# Patient Record
Sex: FEMALE | Race: WHITE | NOT HISPANIC OR LATINO | Employment: OTHER | RURAL
[De-identification: names, ages, dates, MRNs, and addresses within clinical notes are randomized per-mention and may not be internally consistent; named-entity substitution may affect disease eponyms.]

---

## 2023-03-28 ENCOUNTER — TELEPHONE (OUTPATIENT)
Dept: PULMONOLOGY | Facility: CLINIC | Age: 63
End: 2023-03-28

## 2023-03-28 NOTE — TELEPHONE ENCOUNTER
----- Message from Vera Acevedo sent at 3/28/2023  8:28 AM CDT -----  Beatrice at [facility]  is trying to referred patient to Dr. Dempsey. Patient have pulmonary nodule on the rt side and she also have COPD. Ph# [phone].

## 2023-03-28 NOTE — TELEPHONE ENCOUNTER
REFERRAL SCANNED IN, APPT SET UP AND LETTER MAILED OUT. ALSO, GOING TO CALL CLINIC TO INFORM THEM

## 2023-05-01 ENCOUNTER — OFFICE VISIT (OUTPATIENT)
Dept: PULMONOLOGY | Facility: CLINIC | Age: 63
End: 2023-05-01
Payer: MEDICARE

## 2023-05-01 VITALS
RESPIRATION RATE: 16 BRPM | WEIGHT: 172 LBS | DIASTOLIC BLOOD PRESSURE: 84 MMHG | BODY MASS INDEX: 30.48 KG/M2 | OXYGEN SATURATION: 95 % | HEIGHT: 63 IN | HEART RATE: 91 BPM | SYSTOLIC BLOOD PRESSURE: 120 MMHG

## 2023-05-01 DIAGNOSIS — J44.9 CHRONIC OBSTRUCTIVE PULMONARY DISEASE, UNSPECIFIED COPD TYPE: ICD-10-CM

## 2023-05-01 DIAGNOSIS — R91.1 SOLITARY PULMONARY NODULE: ICD-10-CM

## 2023-05-01 DIAGNOSIS — R22.2 LOCALIZED SWELLING, MASS AND LUMP, TRUNK: Primary | ICD-10-CM

## 2023-05-01 PROCEDURE — 99203 OFFICE O/P NEW LOW 30 MIN: CPT | Mod: S$PBB,,, | Performed by: INTERNAL MEDICINE

## 2023-05-01 PROCEDURE — 99214 OFFICE O/P EST MOD 30 MIN: CPT | Mod: PBBFAC | Performed by: INTERNAL MEDICINE

## 2023-05-01 PROCEDURE — 99203 PR OFFICE/OUTPT VISIT, NEW, LEVL III, 30-44 MIN: ICD-10-PCS | Mod: S$PBB,,, | Performed by: INTERNAL MEDICINE

## 2023-05-01 RX ORDER — CHOLECALCIFEROL (VITAMIN D3) 25 MCG
1000 TABLET,CHEWABLE ORAL
COMMUNITY

## 2023-05-01 RX ORDER — LISINOPRIL 10 MG/1
10 TABLET ORAL
COMMUNITY
Start: 2022-12-22

## 2023-05-01 RX ORDER — CYCLOBENZAPRINE HCL 5 MG
TABLET ORAL
COMMUNITY
Start: 2022-11-08

## 2023-05-01 RX ORDER — ASPIRIN 325 MG
325 TABLET, DELAYED RELEASE (ENTERIC COATED) ORAL
COMMUNITY

## 2023-05-01 RX ORDER — ALBUTEROL SULFATE 90 UG/1
AEROSOL, METERED RESPIRATORY (INHALATION)
COMMUNITY
Start: 2023-03-15

## 2023-05-01 RX ORDER — MECLIZINE HYDROCHLORIDE 25 MG/1
25 TABLET ORAL DAILY PRN
COMMUNITY

## 2023-05-01 RX ORDER — ATORVASTATIN CALCIUM 10 MG/1
10 TABLET, FILM COATED ORAL
COMMUNITY
Start: 2022-12-22

## 2023-05-01 RX ORDER — LORATADINE 10 MG/1
10 TABLET ORAL
COMMUNITY

## 2023-05-01 RX ORDER — OMEPRAZOLE 40 MG/1
CAPSULE, DELAYED RELEASE ORAL
COMMUNITY
Start: 2023-03-30

## 2023-05-01 RX ORDER — TRAMADOL HYDROCHLORIDE 50 MG/1
50 TABLET ORAL EVERY 8 HOURS PRN
COMMUNITY
Start: 2022-12-01

## 2023-05-01 RX ORDER — LACTOBACILLUS COMBINATION NO.4 3B CELL
1 CAPSULE ORAL DAILY
COMMUNITY

## 2023-05-02 PROBLEM — J44.9 CHRONIC OBSTRUCTIVE PULMONARY DISEASE, UNSPECIFIED COPD TYPE: Status: ACTIVE | Noted: 2023-05-02

## 2023-05-02 PROBLEM — R91.1 SOLITARY PULMONARY NODULE: Status: ACTIVE | Noted: 2023-05-02

## 2023-05-02 NOTE — PROGRESS NOTES
"Subjective:       Patient ID: Vera Jensen is a 63 y.o. female.    Chief Complaint: Pulmonary Nodules (Referred by Shree) and COPD    Pulmonary Nodules  This is a chronic problem. The current episode started more than 1 month ago. The problem has been unchanged. Pertinent negatives include no abdominal pain, arthralgias, chest pain, chills, congestion, headaches or rash.   COPD  Pertinent negatives include no abdominal pain, arthralgias, chest pain, chills, congestion, headaches or rash.   No past medical history on file.  No past surgical history on file.  No family history on file.  Review of patient's allergies indicates:   Allergen Reactions    Iodine Swelling     "swelling to leg" :has not had problem with recently uses constrast dye.      Social History     Tobacco Use    Smoking status: Every Day     Packs/day: 0.25     Types: Cigarettes     Start date: 1/15/1976    Smokeless tobacco: Never    Tobacco comments:     1-2 cigarettes a day      Review of Systems   Constitutional:  Negative for chills, activity change and night sweats.   HENT:  Negative for congestion and ear pain.    Eyes:  Negative for redness and itching.   Cardiovascular:  Negative for chest pain and palpitations.   Musculoskeletal:  Negative for arthralgias and back pain.   Skin:  Negative for rash.   Gastrointestinal:  Negative for abdominal pain and abdominal distention.   Neurological:  Negative for dizziness and headaches.   Hematological:  Negative for adenopathy. Does not bruise/bleed easily.   Psychiatric/Behavioral:  Negative for confusion. The patient is not nervous/anxious.      Objective:      Physical Exam   Constitutional: She is oriented to person, place, and time. She appears well-developed and well-nourished.   HENT:   Head: Normocephalic.   Nose: Nose normal.   Mouth/Throat: Oropharynx is clear and moist.   Neck: No JVD present. No thyromegaly present.   Cardiovascular: Normal rate, regular rhythm, normal heart sounds " and intact distal pulses.   Pulmonary/Chest: Normal expansion, hyperinflation, symmetric chest wall expansion, effort normal and breath sounds normal.   Abdominal: Soft. Bowel sounds are normal.   Musculoskeletal:         General: Normal range of motion.      Cervical back: Normal range of motion and neck supple.   Lymphadenopathy: No supraclavicular adenopathy is present.     She has no cervical adenopathy.   Neurological: She is alert and oriented to person, place, and time. She has normal reflexes.   Skin: Skin is warm and dry.   Psychiatric: She has a normal mood and affect. Her behavior is normal.   Personal Diagnostic Review  CT chest reviewed    No flowsheet data found.      Assessment:       1. Localized swelling, mass and lump, trunk    2. Chronic obstructive pulmonary disease, unspecified COPD type    3. Solitary pulmonary nodule        Outpatient Encounter Medications as of 5/1/2023   Medication Sig Dispense Refill    albuterol (PROVENTIL/VENTOLIN HFA) 90 mcg/actuation inhaler 2 PUFFS INHALED ORALLY EVERY 4 HOURS AS NEEDED      aspirin (ECOTRIN) 325 MG EC tablet Take 325 mg by mouth.      atorvastatin (LIPITOR) 10 MG tablet Take 10 mg by mouth.      cyanocobalamin, vitamin B-12, 2,500 mcg Tab Take 1,000 mcg by mouth.      cyclobenzaprine (FLEXERIL) 5 MG tablet 1-2 TABLET BY MOUTH THREE TIMES A DAY AS NEEDED FOR MUSCLE PAIN/MUSCLE SPASMS. MAY CAUSE DROWSINESS.      lactobacillus combination no.4 (PROBIOTIC) 3 billion cell Cap Take 1 capsule by mouth once daily.      lisinopriL 10 MG tablet Take 10 mg by mouth.      loratadine (CLARITIN) 10 mg tablet Take 10 mg by mouth.      meclizine (ANTIVERT) 25 mg tablet Take 25 mg by mouth daily as needed.      omeprazole (PRILOSEC) 40 MG capsule 1 CAPSULE BY MOUTH DAILY      soy-blk cohosh-green tea-yerba 56- mg Tab Take 1 tablet by mouth.      traMADoL (ULTRAM) 50 mg tablet Take 50 mg by mouth every 8 (eight) hours as needed.       No facility-administered  encounter medications on file as of 5/1/2023.     Orders Placed This Encounter   Procedures    CT Chest Without Contrast     Standing Status:   Future     Standing Expiration Date:   5/1/2024     Order Specific Question:   May the Radiologist modify the order per protocol to meet the clinical needs of the patient?     Answer:   Yes     Order Specific Question:   Access port per protocol?     Answer:   No       Plan:       Problem List Items Addressed This Visit          Pulmonary    Chronic obstructive pulmonary disease, unspecified COPD type    Solitary pulmonary nodule     Patient is doing well today no new issues.  Has a solitary pulmonary nodule noted 6 mm in the right middle lobe repeat CT chest without contrast in 6 months            Other Visit Diagnoses       Localized swelling, mass and lump, trunk    -  Primary    Relevant Orders    CT Chest Without Contrast

## 2023-05-02 NOTE — ASSESSMENT & PLAN NOTE
Patient is doing well today no new issues.  Has a solitary pulmonary nodule noted 6 mm in the right middle lobe repeat CT chest without contrast in 6 months

## 2023-11-01 ENCOUNTER — HOSPITAL ENCOUNTER (OUTPATIENT)
Dept: RADIOLOGY | Facility: HOSPITAL | Age: 63
Discharge: HOME | End: 2023-11-01
Attending: INTERNAL MEDICINE
Payer: MEDICARE

## 2023-11-01 ENCOUNTER — OFFICE VISIT (OUTPATIENT)
Dept: PULMONOLOGY | Facility: CLINIC | Age: 63
End: 2023-11-01
Payer: MEDICARE

## 2023-11-01 VITALS
OXYGEN SATURATION: 88 % | WEIGHT: 172 LBS | HEART RATE: 76 BPM | BODY MASS INDEX: 30.48 KG/M2 | DIASTOLIC BLOOD PRESSURE: 71 MMHG | SYSTOLIC BLOOD PRESSURE: 108 MMHG | HEIGHT: 63 IN | RESPIRATION RATE: 16 BRPM

## 2023-11-01 DIAGNOSIS — R22.2 LOCALIZED SWELLING, MASS AND LUMP, TRUNK: ICD-10-CM

## 2023-11-01 DIAGNOSIS — R91.1 SOLITARY PULMONARY NODULE: ICD-10-CM

## 2023-11-01 DIAGNOSIS — R22.2 LOCALIZED SWELLING, MASS AND LUMP, TRUNK: Primary | ICD-10-CM

## 2023-11-01 DIAGNOSIS — J44.9 CHRONIC OBSTRUCTIVE PULMONARY DISEASE, UNSPECIFIED COPD TYPE: ICD-10-CM

## 2023-11-01 DIAGNOSIS — Z72.0 TOBACCO USE: ICD-10-CM

## 2023-11-01 PROCEDURE — 71250 CT THORAX DX C-: CPT | Mod: TC

## 2023-11-01 PROCEDURE — 99215 OFFICE O/P EST HI 40 MIN: CPT | Mod: PBBFAC,25 | Performed by: INTERNAL MEDICINE

## 2023-11-01 PROCEDURE — 99214 OFFICE O/P EST MOD 30 MIN: CPT | Mod: S$PBB,,, | Performed by: INTERNAL MEDICINE

## 2023-11-01 PROCEDURE — 71250 CT THORAX DX C-: CPT | Mod: 26,,, | Performed by: RADIOLOGY

## 2023-11-01 PROCEDURE — 99214 PR OFFICE/OUTPT VISIT, EST, LEVL IV, 30-39 MIN: ICD-10-PCS | Mod: S$PBB,,, | Performed by: INTERNAL MEDICINE

## 2023-11-01 PROCEDURE — 71250 CT CHEST WITHOUT CONTRAST: ICD-10-PCS | Mod: 26,,, | Performed by: RADIOLOGY

## 2023-11-01 RX ORDER — CITALOPRAM 10 MG/1
TABLET ORAL
COMMUNITY
Start: 2023-10-30

## 2023-11-01 RX ORDER — TIOTROPIUM BROMIDE 18 UG/1
18 CAPSULE ORAL; RESPIRATORY (INHALATION) DAILY
Qty: 30 CAPSULE | Refills: 5 | Status: SHIPPED | OUTPATIENT
Start: 2023-11-01 | End: 2024-01-03

## 2023-11-01 NOTE — PROGRESS NOTES
"Subjective:       Patient ID: Vera Jensen is a 63 y.o. female.    Chief Complaint: Follow-up (Localized swelling, mass and lump, trunk)    Follow-up  This is a chronic problem. The current episode started more than 1 month ago. The problem has been unchanged. Pertinent negatives include no abdominal pain, arthralgias, chest pain, chills, congestion, headaches or rash.     Past Medical History:   Diagnosis Date    Essential (primary) hypertension      Past Surgical History:   Procedure Laterality Date    BREAST SURGERY      approx 40 years ago, removed a lump    HERNIA REPAIR      two hernia repair surgeries, approx 2-3 yeras    HYSTERECTOMY Bilateral     aproximetely in 1984, partial and bilateral     History reviewed. No pertinent family history.  Review of patient's allergies indicates:   Allergen Reactions    Iodine Swelling     "swelling to leg" :has not had problem with recently uses constrast dye.      Social History     Tobacco Use    Smoking status: Every Day     Current packs/day: 0.25     Average packs/day: 0.3 packs/day for 47.8 years (11.9 ttl pk-yrs)     Types: Cigarettes     Start date: 1/15/1976    Smokeless tobacco: Never    Tobacco comments:     1-2 cigarettes a day   Substance Use Topics    Alcohol use: Never    Drug use: Never      Review of Systems   Constitutional:  Negative for chills, activity change and night sweats.   HENT:  Negative for congestion and ear pain.    Eyes:  Negative for redness and itching.   Cardiovascular:  Negative for chest pain and palpitations.   Musculoskeletal:  Negative for arthralgias and back pain.   Skin:  Negative for rash.   Gastrointestinal:  Negative for abdominal pain and abdominal distention.   Neurological:  Negative for dizziness and headaches.   Hematological:  Negative for adenopathy. Does not bruise/bleed easily.   Psychiatric/Behavioral:  Negative for confusion. The patient is not nervous/anxious.        Objective:      Physical Exam " "  Constitutional: She is oriented to person, place, and time. She appears well-developed and well-nourished.   HENT:   Head: Normocephalic.   Nose: Nose normal.   Mouth/Throat: Oropharynx is clear and moist.   Neck: No JVD present. No thyromegaly present.   Cardiovascular: Normal rate, regular rhythm, normal heart sounds and intact distal pulses.   Pulmonary/Chest: Normal expansion, hyperinflation, symmetric chest wall expansion, effort normal and breath sounds normal.   Abdominal: Soft. Bowel sounds are normal.   Musculoskeletal:         General: Normal range of motion.      Cervical back: Normal range of motion and neck supple.   Lymphadenopathy: No supraclavicular adenopathy is present.     She has no cervical adenopathy.   Neurological: She is alert and oriented to person, place, and time. She has normal reflexes.   Skin: Skin is warm and dry.   Psychiatric: She has a normal mood and affect. Her behavior is normal.     Personal Diagnostic Review  none pertinent        11/1/2023     2:10 PM 5/1/2023     4:04 PM   Pulmonary Function Tests   SpO2 88 % 95 %   Height 5' 3" (1.6 m) 5' 3" (1.6 m)   Weight 78 kg (172 lb) 78 kg (172 lb)   BMI (Calculated) 30.5 30.5         Assessment:       1. Localized swelling, mass and lump, trunk    2. Chronic obstructive pulmonary disease, unspecified COPD type    3. Tobacco use    4. Solitary pulmonary nodule        Outpatient Encounter Medications as of 11/1/2023   Medication Sig Dispense Refill    albuterol (PROVENTIL/VENTOLIN HFA) 90 mcg/actuation inhaler 2 PUFFS INHALED ORALLY EVERY 4 HOURS AS NEEDED      aspirin (ECOTRIN) 325 MG EC tablet Take 325 mg by mouth.      atorvastatin (LIPITOR) 10 MG tablet Take 10 mg by mouth.      citalopram (CELEXA) 10 MG tablet       cyanocobalamin, vitamin B-12, 2,500 mcg Tab Take 1,000 mcg by mouth.      cyclobenzaprine (FLEXERIL) 5 MG tablet 1-2 TABLET BY MOUTH THREE TIMES A DAY AS NEEDED FOR MUSCLE PAIN/MUSCLE SPASMS. MAY CAUSE DROWSINESS.   "    lisinopriL 10 MG tablet Take 10 mg by mouth.      loratadine (CLARITIN) 10 mg tablet Take 10 mg by mouth.      meclizine (ANTIVERT) 25 mg tablet Take 25 mg by mouth daily as needed.      omeprazole (PRILOSEC) 40 MG capsule 1 CAPSULE BY MOUTH DAILY      soy-blk cohosh-green tea-yerba 56- mg Tab Take 1 tablet by mouth.      traMADoL (ULTRAM) 50 mg tablet Take 50 mg by mouth every 8 (eight) hours as needed.      lactobacillus combination no.4 (PROBIOTIC) 3 billion cell Cap Take 1 capsule by mouth once daily.      tiotropium (SPIRIVA WITH HANDIHALER) 18 mcg inhalation capsule Inhale 1 capsule (18 mcg total) into the lungs once daily. Controller 30 capsule 5     No facility-administered encounter medications on file as of 11/1/2023.     Orders Placed This Encounter   Procedures    CT Chest Without Contrast     Standing Status:   Future     Standing Expiration Date:   11/1/2024     Order Specific Question:   May the Radiologist modify the order per protocol to meet the clinical needs of the patient?     Answer:   Yes     Order Specific Question:   Access port per protocol?     Answer:   No       Plan:       Problem List Items Addressed This Visit          Pulmonary    Chronic obstructive pulmonary disease, unspecified COPD type     Patient has worsening shortness of breath.  Were going to go ahead and add Spiriva to her regimen her O2 sat was 88% she needs to quit smoking we talked about smoking cessation its length         Solitary pulmonary nodule     6 mm nodule unchanged repeat CT in 6 months            Other    Tobacco use     Want to use nicotine gum to quit smoking          Other Visit Diagnoses       Localized swelling, mass and lump, trunk    -  Primary    Relevant Orders    CT Chest Without Contrast

## 2023-11-01 NOTE — ASSESSMENT & PLAN NOTE
Patient has worsening shortness of breath.  Were going to go ahead and add Spiriva to her regimen her O2 sat was 88% she needs to quit smoking we talked about smoking cessation its length

## 2023-11-10 RX ORDER — UMECLIDINIUM 62.5 UG/1
62.5 AEROSOL, POWDER ORAL DAILY
Qty: 30 EACH | Refills: 1 | Status: SHIPPED | OUTPATIENT
Start: 2023-11-10 | End: 2024-01-03 | Stop reason: SDUPTHER

## 2024-01-03 ENCOUNTER — HOSPITAL ENCOUNTER (OUTPATIENT)
Dept: RADIOLOGY | Facility: HOSPITAL | Age: 64
Discharge: HOME | End: 2024-01-03
Attending: INTERNAL MEDICINE
Payer: MEDICARE

## 2024-01-03 ENCOUNTER — OFFICE VISIT (OUTPATIENT)
Dept: PULMONOLOGY | Facility: CLINIC | Age: 64
End: 2024-01-03
Payer: MEDICARE

## 2024-01-03 VITALS
WEIGHT: 174.63 LBS | SYSTOLIC BLOOD PRESSURE: 102 MMHG | HEIGHT: 63 IN | BODY MASS INDEX: 30.94 KG/M2 | OXYGEN SATURATION: 94 % | HEART RATE: 103 BPM | DIASTOLIC BLOOD PRESSURE: 86 MMHG | RESPIRATION RATE: 18 BRPM

## 2024-01-03 DIAGNOSIS — R22.2 LOCALIZED SWELLING, MASS AND LUMP, TRUNK: Primary | ICD-10-CM

## 2024-01-03 DIAGNOSIS — Z72.0 TOBACCO USE: ICD-10-CM

## 2024-01-03 DIAGNOSIS — R22.2 LOCALIZED SWELLING, MASS AND LUMP, TRUNK: ICD-10-CM

## 2024-01-03 DIAGNOSIS — J44.9 CHRONIC OBSTRUCTIVE PULMONARY DISEASE, UNSPECIFIED COPD TYPE: ICD-10-CM

## 2024-01-03 DIAGNOSIS — R91.1 SOLITARY PULMONARY NODULE: ICD-10-CM

## 2024-01-03 PROCEDURE — 99215 OFFICE O/P EST HI 40 MIN: CPT | Mod: PBBFAC,25 | Performed by: INTERNAL MEDICINE

## 2024-01-03 PROCEDURE — 71250 CT THORAX DX C-: CPT | Mod: 26,,, | Performed by: STUDENT IN AN ORGANIZED HEALTH CARE EDUCATION/TRAINING PROGRAM

## 2024-01-03 PROCEDURE — 99214 OFFICE O/P EST MOD 30 MIN: CPT | Mod: S$PBB,,, | Performed by: INTERNAL MEDICINE

## 2024-01-03 PROCEDURE — 71250 CT THORAX DX C-: CPT | Mod: TC

## 2024-01-03 RX ORDER — UMECLIDINIUM 62.5 UG/1
62.5 AEROSOL, POWDER ORAL DAILY
Qty: 30 EACH | Refills: 1 | Status: SHIPPED | OUTPATIENT
Start: 2024-01-03

## 2024-01-03 NOTE — PROGRESS NOTES
"Subjective:       Patient ID: Vera Jensen is a 63 y.o. female.    Chief Complaint: Results (CT exam results), Cough, and Shortness of Breath (With exertion)    Cough  This is a chronic problem. The current episode started more than 1 month ago. The problem has been unchanged. Associated symptoms include shortness of breath. Pertinent negatives include no chest pain, chills, ear pain, eye redness, headaches or rash.   Shortness of Breath  Pertinent negatives include no abdominal pain, chest pain, ear pain, headaches or rash.     Past Medical History:   Diagnosis Date    Essential (primary) hypertension      Past Surgical History:   Procedure Laterality Date    BREAST SURGERY      approx 40 years ago, removed a lump    HERNIA REPAIR      two hernia repair surgeries, approx 2-3 yeras    HYSTERECTOMY Bilateral     aproximetely in 1984, partial and bilateral     History reviewed. No pertinent family history.  Review of patient's allergies indicates:   Allergen Reactions    Iodine Swelling     "swelling to leg" :has not had problem with recently uses constrast dye.      Social History     Tobacco Use    Smoking status: Every Day     Current packs/day: 0.25     Average packs/day: 0.3 packs/day for 48.0 years (12.0 ttl pk-yrs)     Types: Cigarettes     Start date: 1/15/1976    Smokeless tobacco: Never    Tobacco comments:     1-2 cigarettes a day   Substance Use Topics    Alcohol use: Never    Drug use: Never      Review of Systems   Constitutional:  Negative for chills, activity change and night sweats.   HENT:  Negative for congestion and ear pain.    Eyes:  Negative for redness and itching.   Respiratory:  Positive for cough and shortness of breath.    Cardiovascular:  Negative for chest pain and palpitations.   Musculoskeletal:  Negative for arthralgias and back pain.   Skin:  Negative for rash.   Gastrointestinal:  Negative for abdominal pain and abdominal distention.   Neurological:  Negative for dizziness and " "headaches.   Hematological:  Negative for adenopathy. Does not bruise/bleed easily.   Psychiatric/Behavioral:  Negative for confusion. The patient is not nervous/anxious.        Objective:      Physical Exam   Constitutional: She is oriented to person, place, and time. She appears well-developed and well-nourished.   HENT:   Head: Normocephalic.   Nose: Nose normal.   Mouth/Throat: Oropharynx is clear and moist.   Neck: No JVD present. No thyromegaly present.   Cardiovascular: Normal rate, regular rhythm, normal heart sounds and intact distal pulses.   Pulmonary/Chest: Normal expansion, hyperinflation, symmetric chest wall expansion, effort normal and breath sounds normal.   Abdominal: Soft. Bowel sounds are normal.   Musculoskeletal:         General: Normal range of motion.      Cervical back: Normal range of motion and neck supple.   Lymphadenopathy: No supraclavicular adenopathy is present.     She has no cervical adenopathy.   Neurological: She is alert and oriented to person, place, and time. She has normal reflexes.   Skin: Skin is warm and dry.   Psychiatric: She has a normal mood and affect. Her behavior is normal.     Personal Diagnostic Review  none pertinent        1/3/2024     3:28 PM 11/1/2023     2:10 PM 5/1/2023     4:04 PM   Pulmonary Function Tests   SpO2 94 % 88 % 95 %   Height 5' 3" (1.6 m) 5' 3" (1.6 m) 5' 3" (1.6 m)   Weight 79.2 kg (174 lb 9.6 oz) 78 kg (172 lb) 78 kg (172 lb)   BMI (Calculated) 30.9 30.5 30.5         Assessment:       1. Localized swelling, mass and lump, trunk    2. Chronic obstructive pulmonary disease, unspecified COPD type    3. Tobacco use    4. Solitary pulmonary nodule        Outpatient Encounter Medications as of 1/3/2024   Medication Sig Dispense Refill    albuterol (PROVENTIL/VENTOLIN HFA) 90 mcg/actuation inhaler 2 PUFFS INHALED ORALLY EVERY 4 HOURS AS NEEDED      aspirin (ECOTRIN) 325 MG EC tablet Take 325 mg by mouth.      atorvastatin (LIPITOR) 10 MG tablet Take " 10 mg by mouth.      citalopram (CELEXA) 10 MG tablet       cyanocobalamin, vitamin B-12, 2,500 mcg Tab Take 1,000 mcg by mouth.      cyclobenzaprine (FLEXERIL) 5 MG tablet 1-2 TABLET BY MOUTH THREE TIMES A DAY AS NEEDED FOR MUSCLE PAIN/MUSCLE SPASMS. MAY CAUSE DROWSINESS.      lactobacillus combination no.4 (PROBIOTIC) 3 billion cell Cap Take 1 capsule by mouth once daily.      lisinopriL 10 MG tablet Take 10 mg by mouth.      loratadine (CLARITIN) 10 mg tablet Take 10 mg by mouth.      omeprazole (PRILOSEC) 40 MG capsule 1 CAPSULE BY MOUTH DAILY      soy-blk cohosh-green tea-yerba 56- mg Tab Take 1 tablet by mouth.      meclizine (ANTIVERT) 25 mg tablet Take 25 mg by mouth daily as needed.      traMADoL (ULTRAM) 50 mg tablet Take 50 mg by mouth every 8 (eight) hours as needed.      umeclidinium (INCRUSE ELLIPTA) 62.5 mcg/actuation inhalation capsule Inhale 62.5 mcg into the lungs once daily. Controller 30 each 1    [DISCONTINUED] tiotropium (SPIRIVA WITH HANDIHALER) 18 mcg inhalation capsule Inhale 1 capsule (18 mcg total) into the lungs once daily. Controller (Patient not taking: Reported on 1/3/2024) 30 capsule 5    [DISCONTINUED] umeclidinium (INCRUSE ELLIPTA) 62.5 mcg/actuation inhalation capsule Inhale 62.5 mcg into the lungs once daily. Controller (Patient not taking: Reported on 1/3/2024) 30 each 1     No facility-administered encounter medications on file as of 1/3/2024.     Orders Placed This Encounter   Procedures    CT Chest Without Contrast     Standing Status:   Future     Standing Expiration Date:   1/3/2025     Order Specific Question:   May the Radiologist modify the order per protocol to meet the clinical needs of the patient?     Answer:   Yes     Order Specific Question:   Access port per protocol?     Answer:   No       Plan:       Problem List Items Addressed This Visit          Pulmonary    Chronic obstructive pulmonary disease, unspecified COPD type     Patient never got Spiriva filled  is never taking Incruse will try give him Incruse if that does not work we have asked her to get a list of medicines her insurance will pay for will write 1 of those continue use her Ventolin as needed         Solitary pulmonary nodule     Solitary pulmonary nodule unchanged x6 months            Other    Tobacco use     She not ready to quit smoking this point          Other Visit Diagnoses       Localized swelling, mass and lump, trunk    -  Primary    Relevant Orders    CT Chest Without Contrast

## 2024-01-03 NOTE — ASSESSMENT & PLAN NOTE
Patient never got Spiriva filled is never taking Incruse will try give him Incruse if that does not work we have asked her to get a list of medicines her insurance will pay for will write 1 of those continue use her Ventolin as needed

## 2024-07-10 ENCOUNTER — OFFICE VISIT (OUTPATIENT)
Dept: PULMONOLOGY | Facility: CLINIC | Age: 64
End: 2024-07-10
Payer: MEDICARE

## 2024-07-10 ENCOUNTER — HOSPITAL ENCOUNTER (OUTPATIENT)
Dept: RADIOLOGY | Facility: HOSPITAL | Age: 64
Discharge: HOME | End: 2024-07-10
Attending: INTERNAL MEDICINE
Payer: MEDICARE

## 2024-07-10 VITALS
TEMPERATURE: 98 F | WEIGHT: 176 LBS | OXYGEN SATURATION: 97 % | HEIGHT: 63 IN | DIASTOLIC BLOOD PRESSURE: 85 MMHG | SYSTOLIC BLOOD PRESSURE: 125 MMHG | BODY MASS INDEX: 31.18 KG/M2 | HEART RATE: 83 BPM

## 2024-07-10 DIAGNOSIS — Z72.0 TOBACCO USE: ICD-10-CM

## 2024-07-10 DIAGNOSIS — R22.2 LOCALIZED SWELLING, MASS AND LUMP, TRUNK: ICD-10-CM

## 2024-07-10 DIAGNOSIS — R91.1 SOLITARY PULMONARY NODULE: ICD-10-CM

## 2024-07-10 DIAGNOSIS — J44.9 CHRONIC OBSTRUCTIVE PULMONARY DISEASE, UNSPECIFIED COPD TYPE: ICD-10-CM

## 2024-07-10 DIAGNOSIS — R22.2 LOCALIZED SWELLING, MASS AND LUMP, TRUNK: Primary | ICD-10-CM

## 2024-07-10 PROCEDURE — 71250 CT THORAX DX C-: CPT | Mod: TC

## 2024-07-10 PROCEDURE — 99214 OFFICE O/P EST MOD 30 MIN: CPT | Mod: PBBFAC,25 | Performed by: INTERNAL MEDICINE

## 2024-07-10 PROCEDURE — 99214 OFFICE O/P EST MOD 30 MIN: CPT | Mod: S$PBB,,, | Performed by: INTERNAL MEDICINE

## 2024-07-10 PROCEDURE — 99999 PR PBB SHADOW E&M-EST. PATIENT-LVL IV: CPT | Mod: PBBFAC,,, | Performed by: INTERNAL MEDICINE

## 2024-07-10 PROCEDURE — 71250 CT THORAX DX C-: CPT | Mod: 26,,, | Performed by: RADIOLOGY

## 2024-07-10 RX ORDER — UMECLIDINIUM 62.5 UG/1
62.5 AEROSOL, POWDER ORAL DAILY
Qty: 60 EACH | Refills: 3 | Status: SHIPPED | OUTPATIENT
Start: 2024-07-10

## 2024-07-10 RX ORDER — ALBUTEROL SULFATE 90 UG/1
1 AEROSOL, METERED RESPIRATORY (INHALATION) EVERY 6 HOURS PRN
Qty: 18 G | Refills: 5 | Status: SHIPPED | OUTPATIENT
Start: 2024-07-10

## 2024-07-10 NOTE — PROGRESS NOTES
"Subjective:       Patient ID: Vera Jensen is a 64 y.o. female.    Chief Complaint: Follow-up (6m CT/Needs refill on her albuterol )    sob    Follow-up  This is a chronic problem. The current episode started more than 1 year ago. The problem occurs daily. The problem has been unchanged. Pertinent negatives include no abdominal pain, arthralgias, chest pain, chills, congestion, headaches or rash. The symptoms are aggravated by exertion.     Past Medical History:   Diagnosis Date    Essential (primary) hypertension      Past Surgical History:   Procedure Laterality Date    BREAST SURGERY      approx 40 years ago, removed a lump    HERNIA REPAIR      two hernia repair surgeries, approx 2-3 yeras    HYSTERECTOMY Bilateral     aproximetely in 1984, partial and bilateral     No family history on file.  Review of patient's allergies indicates:   Allergen Reactions    Iodine Swelling     "swelling to leg" :has not had problem with recently uses constrast dye.      Social History     Tobacco Use    Smoking status: Every Day     Current packs/day: 0.25     Average packs/day: 0.3 packs/day for 48.5 years (12.1 ttl pk-yrs)     Types: Cigarettes     Start date: 1/15/1976    Smokeless tobacco: Never    Tobacco comments:     1-2 cigarettes a day     1-2 pack a day 7/10/24   Substance Use Topics    Alcohol use: Never    Drug use: Never      Review of Systems   Constitutional:  Negative for chills, activity change and night sweats.   HENT:  Negative for congestion and ear pain.    Eyes:  Negative for redness and itching.   Cardiovascular:  Negative for chest pain and palpitations.   Musculoskeletal:  Negative for arthralgias and back pain.   Skin:  Negative for rash.   Gastrointestinal:  Negative for abdominal pain and abdominal distention.   Neurological:  Negative for dizziness and headaches.   Hematological:  Negative for adenopathy. Does not bruise/bleed easily.   Psychiatric/Behavioral:  Negative for confusion. The patient " "is not nervous/anxious.        Objective:      Physical Exam   Constitutional: She is oriented to person, place, and time. She appears well-developed and well-nourished.   HENT:   Head: Normocephalic.   Nose: Nose normal.   Mouth/Throat: Oropharynx is clear and moist.   Neck: No JVD present. No thyromegaly present.   Cardiovascular: Normal rate, regular rhythm, normal heart sounds and intact distal pulses.   Pulmonary/Chest: Normal expansion, hyperinflation, symmetric chest wall expansion, effort normal and breath sounds normal.   Abdominal: Soft. Bowel sounds are normal.   Musculoskeletal:         General: Normal range of motion.      Cervical back: Normal range of motion and neck supple.   Lymphadenopathy: No supraclavicular adenopathy is present.     She has no cervical adenopathy.   Neurological: She is alert and oriented to person, place, and time. She has normal reflexes.   Skin: Skin is warm and dry.   Psychiatric: She has a normal mood and affect. Her behavior is normal.     Personal Diagnostic Review  none pertinent        7/10/2024     2:30 PM 1/3/2024     3:28 PM 11/1/2023     2:10 PM 5/1/2023     4:04 PM   Pulmonary Function Tests   SpO2 97 % 94 % 88 % 95 %   Height 5' 3" (1.6 m) 5' 3" (1.6 m) 5' 3" (1.6 m) 5' 3" (1.6 m)   Weight 79.8 kg (176 lb) 79.2 kg (174 lb 9.6 oz) 78 kg (172 lb) 78 kg (172 lb)   BMI (Calculated) 31.2 30.9 30.5 30.5         Assessment:       1. Localized swelling, mass and lump, trunk    2. Chronic obstructive pulmonary disease, unspecified COPD type    3. Solitary pulmonary nodule    4. Tobacco use        Outpatient Encounter Medications as of 7/10/2024   Medication Sig Dispense Refill    aspirin (ECOTRIN) 325 MG EC tablet Take 325 mg by mouth.      atorvastatin (LIPITOR) 10 MG tablet Take 10 mg by mouth.      citalopram (CELEXA) 10 MG tablet       cyanocobalamin, vitamin B-12, 2,500 mcg Tab Take 1,000 mcg by mouth.      cyclobenzaprine (FLEXERIL) 5 MG tablet 1-2 TABLET BY MOUTH " THREE TIMES A DAY AS NEEDED FOR MUSCLE PAIN/MUSCLE SPASMS. MAY CAUSE DROWSINESS.      lactobacillus combination no.4 (PROBIOTIC) 3 billion cell Cap Take 1 capsule by mouth once daily.      lisinopriL 10 MG tablet Take 10 mg by mouth.      loratadine (CLARITIN) 10 mg tablet Take 10 mg by mouth.      meclizine (ANTIVERT) 25 mg tablet Take 25 mg by mouth daily as needed.      omeprazole (PRILOSEC) 40 MG capsule 1 CAPSULE BY MOUTH DAILY      soy-blk cohosh-green tea-yerba 56- mg Tab Take 1 tablet by mouth.      traMADoL (ULTRAM) 50 mg tablet Take 50 mg by mouth every 8 (eight) hours as needed.      [DISCONTINUED] albuterol (PROVENTIL/VENTOLIN HFA) 90 mcg/actuation inhaler 2 PUFFS INHALED ORALLY EVERY 4 HOURS AS NEEDED      [DISCONTINUED] umeclidinium (INCRUSE ELLIPTA) 62.5 mcg/actuation inhalation capsule Inhale 62.5 mcg into the lungs once daily. Controller 30 each 1    albuterol (PROVENTIL/VENTOLIN HFA) 90 mcg/actuation inhaler Inhale 1 puff into the lungs every 6 (six) hours as needed for Wheezing. Rescue 18 g 5    umeclidinium (INCRUSE ELLIPTA) 62.5 mcg/actuation inhalation capsule Inhale 62.5 mcg into the lungs once daily. Controller 60 each 3     No facility-administered encounter medications on file as of 7/10/2024.     Orders Placed This Encounter   Procedures    CT Chest Without Contrast     Standing Status:   Future     Standing Expiration Date:   7/10/2025     Order Specific Question:   May the Radiologist modify the order per protocol to meet the clinical needs of the patient?     Answer:   Yes     Order Specific Question:   Access port per protocol?     Answer:   No       Plan:       Problem List Items Addressed This Visit          Pulmonary    Chronic obstructive pulmonary disease, unspecified COPD type     mayPatient has COPD currently under pretty good control with the Incruse and Ventolin she has not required admission to the hospital currently needs to quit smoking increase activity wound  pneumonia shot         Solitary pulmonary nodule     6 mm nodule right upper lobe seems unchanged this is 14 months repeat CT in 6 months            Other    Tobacco use     Continues to smoke we discussed smoking cessation today nicotine lozenges          Other Visit Diagnoses       Localized swelling, mass and lump, trunk    -  Primary    Relevant Orders    CT Chest Without Contrast

## 2024-07-10 NOTE — ASSESSMENT & PLAN NOTE
mayPatient has COPD currently under pretty good control with the Incruse and Ventolin she has not required admission to the hospital currently needs to quit smoking increase activity wound pneumonia shot

## 2025-01-22 RX ORDER — LYSINE HCL 500 MG
1 TABLET ORAL DAILY
COMMUNITY

## 2025-01-22 RX ORDER — TURMERIC 400 MG
CAPSULE ORAL
COMMUNITY

## 2025-01-22 RX ORDER — PREDNISOLONE ACETATE 10 MG/ML
1 SUSPENSION/ DROPS OPHTHALMIC
COMMUNITY
Start: 2024-05-29

## 2025-01-22 RX ORDER — MOXIFLOXACIN 5 MG/ML
1 SOLUTION/ DROPS OPHTHALMIC
COMMUNITY
Start: 2024-05-29

## 2025-01-22 RX ORDER — MAGNESIUM OXIDE 420 MG/1
TABLET ORAL
COMMUNITY

## 2025-01-23 ENCOUNTER — HOSPITAL ENCOUNTER (OUTPATIENT)
Dept: RADIOLOGY | Facility: HOSPITAL | Age: 65
Discharge: HOME | End: 2025-01-23
Attending: INTERNAL MEDICINE
Payer: MEDICARE

## 2025-01-23 ENCOUNTER — OFFICE VISIT (OUTPATIENT)
Dept: PULMONOLOGY | Facility: CLINIC | Age: 65
End: 2025-01-23
Payer: MEDICARE

## 2025-01-23 VITALS
DIASTOLIC BLOOD PRESSURE: 64 MMHG | BODY MASS INDEX: 31.17 KG/M2 | HEART RATE: 69 BPM | WEIGHT: 175.94 LBS | OXYGEN SATURATION: 93 % | SYSTOLIC BLOOD PRESSURE: 112 MMHG | RESPIRATION RATE: 16 BRPM | HEIGHT: 63 IN

## 2025-01-23 DIAGNOSIS — R22.2 LOCALIZED SWELLING, MASS AND LUMP, TRUNK: Primary | ICD-10-CM

## 2025-01-23 DIAGNOSIS — R22.2 LOCALIZED SWELLING, MASS AND LUMP, TRUNK: ICD-10-CM

## 2025-01-23 DIAGNOSIS — Z72.0 TOBACCO USE: ICD-10-CM

## 2025-01-23 DIAGNOSIS — R91.1 SOLITARY PULMONARY NODULE: ICD-10-CM

## 2025-01-23 DIAGNOSIS — J44.9 CHRONIC OBSTRUCTIVE PULMONARY DISEASE, UNSPECIFIED COPD TYPE: ICD-10-CM

## 2025-01-23 PROCEDURE — 3008F BODY MASS INDEX DOCD: CPT | Mod: CPTII,,, | Performed by: INTERNAL MEDICINE

## 2025-01-23 PROCEDURE — 1159F MED LIST DOCD IN RCRD: CPT | Mod: CPTII,,, | Performed by: INTERNAL MEDICINE

## 2025-01-23 PROCEDURE — 3078F DIAST BP <80 MM HG: CPT | Mod: CPTII,,, | Performed by: INTERNAL MEDICINE

## 2025-01-23 PROCEDURE — 99215 OFFICE O/P EST HI 40 MIN: CPT | Mod: PBBFAC,25 | Performed by: INTERNAL MEDICINE

## 2025-01-23 PROCEDURE — 3074F SYST BP LT 130 MM HG: CPT | Mod: CPTII,,, | Performed by: INTERNAL MEDICINE

## 2025-01-23 PROCEDURE — 71250 CT THORAX DX C-: CPT | Mod: TC

## 2025-01-23 PROCEDURE — 99214 OFFICE O/P EST MOD 30 MIN: CPT | Mod: S$PBB,,, | Performed by: INTERNAL MEDICINE

## 2025-01-23 PROCEDURE — 99999 PR PBB SHADOW E&M-EST. PATIENT-LVL V: CPT | Mod: PBBFAC,,, | Performed by: INTERNAL MEDICINE

## 2025-01-23 RX ORDER — ALBUTEROL SULFATE 90 UG/1
1 INHALANT RESPIRATORY (INHALATION) EVERY 6 HOURS PRN
Qty: 18 G | Refills: 5 | Status: SHIPPED | OUTPATIENT
Start: 2025-01-23

## 2025-01-23 RX ORDER — ALBUTEROL SULFATE 0.83 MG/ML
2.5 SOLUTION RESPIRATORY (INHALATION) EVERY 6 HOURS PRN
Qty: 90 ML | Refills: 5 | Status: SHIPPED | OUTPATIENT
Start: 2025-01-23 | End: 2026-01-23

## 2025-01-23 RX ORDER — UMECLIDINIUM 62.5 UG/1
62.5 AEROSOL, POWDER ORAL DAILY
Qty: 60 EACH | Refills: 3 | Status: SHIPPED | OUTPATIENT
Start: 2025-01-23

## 2025-01-23 NOTE — ASSESSMENT & PLAN NOTE
Patient continues to smoke we talked about methods of smoking cessation including nicotine replacement

## 2025-01-23 NOTE — PROGRESS NOTES
"Subjective:       Patient ID: Vera Jensen is a 64 y.o. female.    Chief Complaint: Follow-up (6mo follow up/CT. C/o of congestion lately. )    Follow-up  This is a chronic problem. The current episode started more than 1 month ago. The problem has been unchanged. Pertinent negatives include no abdominal pain, arthralgias, chest pain, chills, congestion, headaches or rash. The symptoms are aggravated by exertion.     Past Medical History:   Diagnosis Date    Essential (primary) hypertension      Past Surgical History:   Procedure Laterality Date    BREAST SURGERY      approx 40 years ago, removed a lump    HERNIA REPAIR      two hernia repair surgeries, approx 2-3 yeras    HYSTERECTOMY Bilateral     aproximetely in 1984, partial and bilateral     No family history on file.  Review of patient's allergies indicates:   Allergen Reactions    Iodine Swelling     "swelling to leg" :has not had problem with recently uses constrast dye.      Social History     Tobacco Use    Smoking status: Every Day     Current packs/day: 0.25     Average packs/day: 0.3 packs/day for 49.0 years (12.3 ttl pk-yrs)     Types: Cigarettes     Start date: 1/15/1976    Smokeless tobacco: Never    Tobacco comments:     1-2 cigarettes a day     1-2 pack a day 7/10/24   Substance Use Topics    Alcohol use: Never    Drug use: Never      Review of Systems   Constitutional:  Negative for chills, activity change and night sweats.   HENT:  Negative for congestion and ear pain.    Eyes:  Negative for redness and itching.   Cardiovascular:  Negative for chest pain and palpitations.   Musculoskeletal:  Negative for arthralgias and back pain.   Skin:  Negative for rash.   Gastrointestinal:  Negative for abdominal pain and abdominal distention.   Neurological:  Negative for dizziness and headaches.   Hematological:  Negative for adenopathy. Does not bruise/bleed easily.   Psychiatric/Behavioral:  Negative for confusion. The patient is not nervous/anxious.  " "      Objective:      Physical Exam   Constitutional: She is oriented to person, place, and time. She appears well-developed and well-nourished.   HENT:   Head: Normocephalic.   Nose: Nose normal.   Mouth/Throat: Oropharynx is clear and moist.   Neck: No JVD present. No thyromegaly present.   Cardiovascular: Normal rate, regular rhythm, normal heart sounds and intact distal pulses.   Pulmonary/Chest: Normal expansion, hyperinflation, symmetric chest wall expansion, effort normal and breath sounds normal.   Abdominal: Soft. Bowel sounds are normal.   Musculoskeletal:         General: Normal range of motion.      Cervical back: Normal range of motion and neck supple.   Lymphadenopathy: No supraclavicular adenopathy is present.     She has no cervical adenopathy.   Neurological: She is alert and oriented to person, place, and time. She has normal reflexes.   Skin: Skin is warm and dry.   Psychiatric: She has a normal mood and affect. Her behavior is normal.     Personal Diagnostic Review  none pertinent        1/23/2025     1:24 PM 7/10/2024     2:30 PM 1/3/2024     3:28 PM 11/1/2023     2:10 PM 5/1/2023     4:04 PM   Pulmonary Function Tests   SpO2 93 % 97 % 94 % 88 % 95 %   Height 5' 3" (1.6 m) 5' 3" (1.6 m) 5' 3" (1.6 m) 5' 3" (1.6 m) 5' 3" (1.6 m)   Weight 79.8 kg (175 lb 14.8 oz) 79.8 kg (176 lb) 79.2 kg (174 lb 9.6 oz) 78 kg (172 lb) 78 kg (172 lb)   BMI (Calculated) 31.2 31.2 30.9 30.5 30.5         Assessment:       1. Localized swelling, mass and lump, trunk    2. Chronic obstructive pulmonary disease, unspecified COPD type    3. Tobacco use    4. Solitary pulmonary nodule        Outpatient Encounter Medications as of 1/23/2025   Medication Sig Dispense Refill    aspirin (ECOTRIN) 325 MG EC tablet Take 325 mg by mouth.      atorvastatin (LIPITOR) 10 MG tablet Take 10 mg by mouth.      citalopram (CELEXA) 10 MG tablet       cyanocobalamin, vitamin B-12, 2,500 mcg Tab Take 1,000 mcg by mouth.      cyclobenzaprine " (FLEXERIL) 5 MG tablet 1-2 TABLET BY MOUTH THREE TIMES A DAY AS NEEDED FOR MUSCLE PAIN/MUSCLE SPASMS. MAY CAUSE DROWSINESS.      lisinopriL 10 MG tablet Take 10 mg by mouth.      loratadine (CLARITIN) 10 mg tablet Take 10 mg by mouth.      magnesium oxide 420 mg Tab Take by mouth.      meclizine (ANTIVERT) 25 mg tablet Take 25 mg by mouth daily as needed.      moxifloxacin (VIGAMOX) 0.5 % ophthalmic solution Apply 1 drop to eye.      omeprazole (PRILOSEC) 40 MG capsule 1 CAPSULE BY MOUTH DAILY      prednisoLONE acetate (PRED FORTE) 1 % DrpS Apply 1 drop to eye.      [DISCONTINUED] albuterol (PROVENTIL/VENTOLIN HFA) 90 mcg/actuation inhaler Inhale 1 puff into the lungs every 6 (six) hours as needed for Wheezing. Rescue 18 g 5    [DISCONTINUED] umeclidinium (INCRUSE ELLIPTA) 62.5 mcg/actuation inhalation capsule Inhale 62.5 mcg into the lungs once daily. Controller 60 each 3    albuterol (PROVENTIL) 2.5 mg /3 mL (0.083 %) nebulizer solution Take 3 mLs (2.5 mg total) by nebulization every 6 (six) hours as needed for Wheezing. Rescue 90 mL 5    albuterol (PROVENTIL/VENTOLIN HFA) 90 mcg/actuation inhaler Inhale 1 puff into the lungs every 6 (six) hours as needed for Wheezing. Rescue 18 g 5    calcium carbonate-vit D3-min 600 mg-10 mcg (400 unit) Tab Take 1 tablet by mouth once daily. (Patient not taking: Reported on 1/23/2025)      L. acidophilus/Bifid. animalis 32 billion cell Cap Take 1 capsule by mouth. (Patient not taking: Reported on 1/23/2025)      lactobacillus combination no.4 (PROBIOTIC) 3 billion cell Cap Take 1 capsule by mouth once daily. (Patient not taking: Reported on 1/23/2025)      soy-blk cohosh-green tea-yerba 56- mg Tab Take 1 tablet by mouth. (Patient not taking: Reported on 1/23/2025)      traMADoL (ULTRAM) 50 mg tablet Take 50 mg by mouth every 8 (eight) hours as needed. (Patient not taking: Reported on 1/23/2025)      turmeric 400 mg Cap Take by mouth. (Patient not taking: Reported on  1/23/2025)      umeclidinium (INCRUSE ELLIPTA) 62.5 mcg/actuation inhalation capsule Inhale 62.5 mcg into the lungs once daily. Controller 60 each 3     No facility-administered encounter medications on file as of 1/23/2025.     Orders Placed This Encounter   Procedures    CT Chest Without Contrast     Standing Status:   Future     Standing Expiration Date:   1/23/2026     Order Specific Question:   May the Radiologist modify the order per protocol to meet the clinical needs of the patient?     Answer:   Yes     Order Specific Question:   Access port per protocol?     Answer:   No       Plan:       Problem List Items Addressed This Visit          Pulmonary    Chronic obstructive pulmonary disease, unspecified COPD type     Patient has COPD which is currently stable currently using Incruse and Ventolin we have set in prescriptions for both         Solitary pulmonary nodule     No change in nodules she has multiple but none bigger than 5 mm were going to repeat CT in 6 months that will put his at 26 months follow-up            Other    Tobacco use     Patient continues to smoke we talked about methods of smoking cessation including nicotine replacement          Other Visit Diagnoses       Localized swelling, mass and lump, trunk    -  Primary    Relevant Orders    CT Chest Without Contrast

## 2025-01-23 NOTE — ASSESSMENT & PLAN NOTE
No change in nodules she has multiple but none bigger than 5 mm were going to repeat CT in 6 months that will put his at 26 months follow-up

## 2025-01-23 NOTE — ASSESSMENT & PLAN NOTE
Patient has COPD which is currently stable currently using Incruse and Ventolin we have set in prescriptions for both

## 2025-02-20 ENCOUNTER — OFFICE VISIT (OUTPATIENT)
Dept: DERMATOLOGY | Facility: CLINIC | Age: 65
End: 2025-02-20
Payer: MEDICARE

## 2025-02-20 DIAGNOSIS — L57.0 ACTINIC KERATOSES: Primary | ICD-10-CM

## 2025-02-20 DIAGNOSIS — C44.622 SCC (SQUAMOUS CELL CARCINOMA), HAND, RIGHT: ICD-10-CM

## 2025-02-20 NOTE — PROGRESS NOTES
[facility]  Johnson Hunt MD    [address] [address]  [phone]    Fax: [phone]    Patient Name: Vera Jensen  Medical Record Number: [id number]  PCP: Keisha Jensen NP  Age: 64 y.o. : 1960  Contact: [phone] (home)     CC: SCC  History of Present Illness:     Vera Jensen is a 64 y.o.  female with no history of skin cancer  who presents to clinic today for untreated SCC on the right hand. Biopsy proven 25 [facility] by Dr. Gunter. She is here today to seek treatment and also has scaly lesions on the bilateral hands.    The patient has no other concerns today.    Review of Systems:     Unremarkable other than mentioned above.     Physical Exam:     General: Relaxed, oriented, alert    Skin examination of the scalp, face, neck, chest, back, abdomen, upper extremities and lower extremities were normal except for as listed below      Assessment and Plan:     1. SCC on the right hand    Plan:  Mohs: 25 @0830     2. Actinic Keratoses  Erythematous, scaly papules on bilateral hands     Plan: Liquid Nitrogen.  A total of 3 lesions were treated with liquid nitrogen for 2 freeze-thaw cycles lasting 5 seconds, located on the above locations.   The patient's consent was obtained including but not limited to risks of crusting, scabbing,  blistering, scarring, darker or lighter pigmentary change, recurrence, incomplete removal and infection.    Counseling.  Sun protective clothing and broad spectrum sunscreen can prevent the formation of AK.   AKs can be treated with cryotherapy, photodynamic therapy, imiquimod, topical 5-FU.  Actinic Keratoses are precancerous proliferations that occur within sun damaged skin. If untreated,  a small subset of AKs can develop into Squamous Cell Carcinoma.            Johnson Hunt MD   Mohs Surgery/Dermatologic Oncology  Dermatology

## 2025-02-20 NOTE — PROGRESS NOTES
Mohs Surgery Consult Note    Vera Jensen is a 64 y.o. female who is referred by Dr. Gunter for evaluation of a SCC on the right hand.     Recurrent skin cancer: No    Preoperative Risk Factors:  Current Anticoagulants: Yes asa 325 mg  Endocarditis / Rheumatic Fever hx: No  Immunocompromised: No  Prosthetic joint: No  Congenital heart defect: No  Prosthetic heart valve: No  Diabetic: No  Transplant: No  Pacemaker: No  Defibrillator:  No  Prior problem with local anesthesia: No  Tobacco History: yes  Clindamycin Allergy: No  Pregnant: no     Transmissible Diseases:  HIV No  Hepatitis B or C  No      Exam:  Limited skin exam is normal except for a  SCC  located on the right hand  .    Pathologic Findings:  Accession # pending  Diagnosis: SCC    Assessment and Plan:  Treatment Options : The various treatment options for skin cancer removal were reviewed with the patient in detail. These include Mohs surgery with its high cure rate, excisional surgery, destructive treatment, radiation therapy, and various topical therapies.  Given the indications and high cure rate, the patient has agreed to proceed with Mohs.  Risks and Benefits : The rationale for Mohs was explained to the patient. The risks and benefits to therapy were discussed in detail. Specifically, the risks of infection, scarring, bleeding, dehiscence, hematoma, prolonged wound healing, incomplete removal, allergy to anesthesia, nerve injury, inability to clear the tumor, and recurrence were addressed. The treatment site was clearly identified and confirmed by the patient.    Plan:  Mohs Micrographic Surgery  - Mohs 03/03/2025     Antibiotics: Mupirocin ointment daily     Johnson Hunt MD   Mohs Surgery/Dermatologic Oncology

## 2025-03-03 ENCOUNTER — PROCEDURE VISIT (OUTPATIENT)
Dept: DERMATOLOGY | Facility: CLINIC | Age: 65
End: 2025-03-03
Payer: MEDICARE

## 2025-03-03 VITALS — HEART RATE: 74 BPM | SYSTOLIC BLOOD PRESSURE: 133 MMHG | DIASTOLIC BLOOD PRESSURE: 87 MMHG

## 2025-03-03 DIAGNOSIS — Z91.89 AT RISK FOR SURGICAL SITE INFECTION: ICD-10-CM

## 2025-03-03 DIAGNOSIS — D04.61 SQUAMOUS CELL CARCINOMA IN SITU (SCCIS) OF SKIN OF RIGHT WRIST: Primary | ICD-10-CM

## 2025-03-03 RX ORDER — MUPIROCIN 20 MG/G
OINTMENT TOPICAL 2 TIMES DAILY
Qty: 22 G | Refills: 3 | Status: SHIPPED | OUTPATIENT
Start: 2025-03-03

## 2025-03-03 NOTE — PATIENT INSTRUCTIONS
WOUND CARE INSTRUCTIONS    1. Leave your pressure bandage on for 24 hours (unless told to keep it on for 48 hours). You will not need to perform any wound care until this bandage is removed. Please do not get the bandage wet.  2. When you initially begin wound care, you may let the water hit the pressure bandage to loosen it from your skin. The bandage should be removed before bathing/showering.  3. Wash your hands thoroughly before starting wound care. Do not use the same cloth/rag/sponge you would use to wash the remainder of your body as this may introduce bacteria from other areas of your body and possibly cause infection at the surgical site.  4. Please clean the surgery site once to twice daily with a mild liquid soap (i.e. Dove, Cetaphil, Baby shampoo).   5. Dry the area with a fresh Q-tip or clean gauze.  6. Perform Vinegar soak to the area to help prevent infection. Soak the affected area for 5-10 minutes once daily, then pat dry. To make a quart of the vinegar soak, mix 3 tablespoons white vinegar with 1 quart of luke-warm water.   7. Apply a generous amount of Vaseline or Aquaphor to the wound/sutures (If you have been prescribed antibiotic ointment such as Mupirocin or Gentamicin, use this instead of vaseline/aquaphor). If you are not sure of the sanitary condition of any Vaseline/Aquaphor you may have at home, please purchase a new jar or tube.    8. Cut a non-stick bandage pad to fit the area and then use bandaging tape to hold in place. Paper tape is a good option for very sensitive skin types.  9. You will be doing this wound care regimen until sutures are removed   10.  After surgery, you may restart all your medications that were stopped (if applicable).      If your surgical site is on your forehead, or close to the eye area, you will want to use ice packs. Please apply ice packs every hour for 20 minutes while awake. Sleep elevated for the next two nights as this will help decrease the amount of  bruising and swelling you will notice the evening after surgery and into the next morning.   For surgical areas on your arms/legs, try to keep the area elevated above the level of your heart as much as possible. This will help to decrease swelling. Frequent gentle rubbing of your fingers or toes in that area will prevent numbness and stiffness.   If located on your arm/hand, we ask that you do not lift anything heavier than a gallon of milk for two weeks. Keep the arm/hand elevated to help decrease swelling in the wrist and fingers. Do not wear jewelry as impending swelling could cause discomfort.  For surgical areas on your head/neck, do not bend over or stoop down. Do not drop your head, as this increases blood to the surgical area and can induce bleeding. Refrain from use of hair care products, hair coloring, or permanents until sutures have been removed and/or the surgical site has completely healed.    BATHING: Begin bathing/showering once pressure bandage comes off. Do not let direct water pressure hit the surgery site. It is okay if it gets wet, just let the water roll over.    PAIN: Tylenol (Acetaminophen) or NSAIDs such as ibuprofen (Advil) or naproxen (Aleve) are adequate for pain relief in most cases, if you are able to take those medications. Alternating Tylenol (acetaminophen) and NSAIDs at 3 hour intervals works well as these medications work differently. For instance, take 1 g of Tylenol at hour 0, then 200-400 mg of Advil at hour 3 if needed, then 1 g of Tylenol at hour 6 if needed, then 200-400 mg of Advil at hour 9 if needed. Do not exceed the daily limit for either medication, which can be found on the bottle. We try to avoid narcotic medications as much as possible. If you are still having severe pain not managed by the above methods, please call our clinic.    SIGNS OF POSSIBLE INFECTION: Significant redness surrounding the surgery site that is warm to the touch, persistent or worsening pain,  fever or flu-like symptoms, increased swelling to the area, thick yellow discharge, and/or foul odor. Please call our office as soon as possible if you experience any of these symptoms as you may have an infection.     BLEEDING: A mild amount of blood on the bandage is expected. Soaking through the bandage is not normal. If this occurs, remove the soiled bandage and apply uninterrupted pressure for 20 minutes by the clock. If this does not stop the bleeding, hold pressure for another 20 minutes with an ice pack. If bleeding stops, apply a bandage per wound care instructions.     IF THE BLEEDING PERSISTS, PLEASE CALL OUR OFFICE.     Normal office hours:   After hours:     If you have concerns about how your wound is healing and would like to send us a photo, please send us a [employer] message, or call for instructions on how to securely send an email.

## 2025-03-03 NOTE — PROGRESS NOTES
Mohs Surgery Consult Note     Vera Jensen is a 64 y.o. 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      Recurrent skin cancer: No     Preoperative Risk Factors:  Current Anticoagulants: Yes asa 325 mg  Endocarditis / Rheumatic Fever hx: No  Immunocompromised: No  Prosthetic joint: No  Congenital heart defect: No  Prosthetic heart valve: No  Diabetic: No  Transplant: No  Pacemaker: No  Defibrillator:  No  Prior problem with local anesthesia: No  Tobacco History: yes  Clindamycin Allergy: No  Pregnant: no      Transmissible Diseases:  HIV No  Hepatitis B or C  No        Exam:  Limited skin exam is normal except for a  SCC  located on the right wrist  .     Pathologic Findings:  Accession #   Diagnosis: SCCIS     Assessment and Plan:  Treatment Options : The various treatment options for skin cancer removal were reviewed with the patient in detail. These include Mohs surgery with its high cure rate, excisional surgery, destructive treatment, radiation therapy, and various topical therapies.  Given the indications and high cure rate, the patient has agreed to proceed with Mohs.  Risks and Benefits : The rationale for Mohs was explained to the patient. The risks and benefits to therapy were discussed in detail. Specifically, the risks of infection, scarring, bleeding, dehiscence, hematoma, prolonged wound healing, incomplete removal, allergy to anesthesia, nerve injury, inability to clear the tumor, and recurrence were addressed. The treatment site was clearly identified and confirmed by the patient.     Plan:  Mohs Micrographic Surgery  - Mohs 03/03/2025      Antibiotics: Mupirocin ointment daily      Johnson Hunt MD   Mohs Surgery/Dermatologic Oncology

## 2025-03-03 NOTE — PROGRESS NOTES
Mohs Surgery Operative Note    Patient name: Vera Jensen  Date: 03/03/2025    Mohs accession #:   Previous dermpath accession #:   Location: Right wrist  Preop diagnosis:SCCIS  Postop diagnosis: Same  Mohs AUC score: 9  Number of stages: 1  Preop size: 1.3 x 1.3 cm   Postop size: 1.7 x 1.7 cm   Depth of defect: fat  Repair type: burrows graft   Amount of lidocaine used: 9 cc of 2% w/epi   Surgeon and Pathologist: CANDELARIA Hunt MD     Indications for Mohs Surgery    Removal of the patient's tumor is complicated by the following clinical features: Clinical area critical for tissue conservation (Area H: central face, eyelids, eyebrows, nose, lips, chin, ear, periauricular, temple, genitalia, hands, feet, ankles, nail units and areola)    Based on my medical judgement, Mohs surgery is the most appropriate treatment for this cancer compared to other treatments. I discussed alternative treatments to Mohs surgery and specifically discussed the risks and benefits of curettage, excision with permanent sections, and foregoing treatment. The rationale for Mohs was explained to the patient and consent was obtained. The risks, benefits and alternatives to therapy were discussed in detail. Specifically, the risks of infection, scarring, bleeding, prolonged wound healing, incomplete removal, allergy to anesthesia, nerve injury and recurrence were addressed. Prior to the procedure, the treatment site was clearly identified and confirmed by the patient. All components of Universal Protocol/PAUSE Rule completed. ROSA Hunt MD operated in two distinct and integrated capacities as the surgeon and pathologist.    STAGE I:  The patient was placed on the operating table. The cancer was identified and outlined. The entire surgical field was prepped with chlorhexidine The surgical site was anesthetized using Lidocaine 1% with epinephrine 1:100,000 buffered with sodium bicarbonate 8.4% in a 1:10 ratio.    The area of  clinically apparent tumor was debulked with a 2 mm curette. The layer of tissue was then surgically excised using a #15 blade and was then transferred onto a specimen sheet maintaining the orientation of the specimen. Hemostasis was obtained using monopolar electrodesiccation. The wound site was then covered with a dressing while the tissue samples were processed for examination.    The specimen was oriented, mapped and divided. Each section was then inked and processed in the Mohs lab using the Mohs protocol and submitted for frozen section. The histopathologic sections were reviewed by the surgeon in conjunction with the reference map.     Total blocks: 1 Total slides: 3    Frozen section analysis revealed: No additional tumor identified on microscopic examination by the surgeon. Histology: No malignant cells seen in the sections examined.    Additional Histologic Findings: none     REPAIR: Burows full thickness skin graft and Partial Intermediate Repair    Primary Surgeon : CANDELARIA Hunt  Indication for skin graft:  Suitable location for skin grafting; Avoid high tension repair; Not feasible to close primarily; Prevent free margin distortion    Repair Size: 1.0 x 1.5 cm (graft), 3 cm (donor)  Sutures:  4-0 monocryl; 4-0 prolene     The defect was identified and a marking pen was used to plan the repair. The area was infiltrated with Lidocaine 1% with epinephrine 1:100,000 buffered with sodium bicarbonate 8.4% in a 1:10 ratio, prepped with chlorhexidine and draped with sterile towels.  The defect was trimmed and debeveled. A cone of tissue with similar characteristics to the sacrificed tissue was identified adjacent to the defect for use as a FTSG. The graft was incised sharply using a #15 blade to adipose, excised, thinned and trimmed. Hemostasis was obtained using monopolar electrodesiccation. The donor site was undermined widely and approximated with buried vertical mattress sutures placed in the dermis and  subcutaneous tissue. The graft was meticulously secured to the recipient site with prolene sutures. Percutaneous simple running sutures were carefully placed for maximum eversion and meticulous wound edge approximation.     The wound was cleansed with saline and ointment was applied along the wound surface. A sterile pressure dressing was applied. Wound care instructions were given verbally and in writing. The patient left the operating suite in stable condition. Patient was informed that additional refinement of the resulting surgical scar may be used as a second stage of this reconstruction.    Johnson Hunt MD   Mohs Surgery/Dermatologic Oncology

## 2025-03-17 ENCOUNTER — CLINICAL SUPPORT (OUTPATIENT)
Dept: DERMATOLOGY | Facility: CLINIC | Age: 65
End: 2025-03-17
Payer: MEDICARE

## 2025-03-17 DIAGNOSIS — Z48.02 ENCOUNTER FOR REMOVAL OF SUTURES: Primary | ICD-10-CM

## 2025-03-17 PROCEDURE — 99024 POSTOP FOLLOW-UP VISIT: CPT | Mod: ,,, | Performed by: STUDENT IN AN ORGANIZED HEALTH CARE EDUCATION/TRAINING PROGRAM

## 2025-03-17 NOTE — PROGRESS NOTES
Mohs accession #:   Previous dermpath accession #:   Location: Right wrist  Preop diagnosis:SCCIS  Postop diagnosis: Same  Mohs AUC score: 9  Number of stages: 1  Preop size: 1.3 x 1.3 cm   Postop size: 1.7 x 1.7 cm   Depth of defect: fat  Repair type: burrows graft   Amount of lidocaine used: 9 cc of 2% w/epi   Surgeon and Pathologist: CANDELARIA Hunt MD     Sutures removed. Patient tolerated well. No complaints from patient. Incision healing well.     Lluvia Chauhan RN

## 2025-07-23 ENCOUNTER — HOSPITAL ENCOUNTER (OUTPATIENT)
Dept: RADIOLOGY | Facility: HOSPITAL | Age: 65
Discharge: HOME | End: 2025-07-23
Attending: INTERNAL MEDICINE
Payer: MEDICARE

## 2025-07-23 DIAGNOSIS — R22.2 LOCALIZED SWELLING, MASS AND LUMP, TRUNK: ICD-10-CM

## 2025-07-23 PROCEDURE — 71250 CT THORAX DX C-: CPT | Mod: 26,,, | Performed by: RADIOLOGY

## 2025-07-23 PROCEDURE — 71250 CT THORAX DX C-: CPT | Mod: TC

## 2025-07-31 ENCOUNTER — OFFICE VISIT (OUTPATIENT)
Dept: PULMONOLOGY | Facility: CLINIC | Age: 65
End: 2025-07-31
Payer: MEDICARE

## 2025-07-31 VITALS
DIASTOLIC BLOOD PRESSURE: 71 MMHG | HEART RATE: 71 BPM | RESPIRATION RATE: 18 BRPM | SYSTOLIC BLOOD PRESSURE: 98 MMHG | WEIGHT: 168.38 LBS | OXYGEN SATURATION: 93 % | HEIGHT: 63 IN | BODY MASS INDEX: 29.84 KG/M2

## 2025-07-31 DIAGNOSIS — J44.9 CHRONIC OBSTRUCTIVE PULMONARY DISEASE, UNSPECIFIED COPD TYPE: Primary | ICD-10-CM

## 2025-07-31 DIAGNOSIS — R91.1 SOLITARY PULMONARY NODULE: ICD-10-CM

## 2025-07-31 DIAGNOSIS — Z72.0 TOBACCO USE: ICD-10-CM

## 2025-07-31 PROCEDURE — 3074F SYST BP LT 130 MM HG: CPT | Mod: CPTII,,, | Performed by: INTERNAL MEDICINE

## 2025-07-31 PROCEDURE — 99999 PR PBB SHADOW E&M-EST. PATIENT-LVL V: CPT | Mod: PBBFAC,,, | Performed by: INTERNAL MEDICINE

## 2025-07-31 PROCEDURE — 4010F ACE/ARB THERAPY RXD/TAKEN: CPT | Mod: CPTII,,, | Performed by: INTERNAL MEDICINE

## 2025-07-31 PROCEDURE — 1159F MED LIST DOCD IN RCRD: CPT | Mod: CPTII,,, | Performed by: INTERNAL MEDICINE

## 2025-07-31 PROCEDURE — 3008F BODY MASS INDEX DOCD: CPT | Mod: CPTII,,, | Performed by: INTERNAL MEDICINE

## 2025-07-31 PROCEDURE — 3078F DIAST BP <80 MM HG: CPT | Mod: CPTII,,, | Performed by: INTERNAL MEDICINE

## 2025-07-31 PROCEDURE — 99214 OFFICE O/P EST MOD 30 MIN: CPT | Mod: S$PBB,,, | Performed by: INTERNAL MEDICINE

## 2025-07-31 PROCEDURE — 99215 OFFICE O/P EST HI 40 MIN: CPT | Mod: PBBFAC | Performed by: INTERNAL MEDICINE

## 2025-07-31 RX ORDER — FLUTICASONE PROPIONATE 50 MCG
2 SPRAY, SUSPENSION (ML) NASAL
COMMUNITY
Start: 2025-07-08

## 2025-07-31 NOTE — PROGRESS NOTES
"Subjective:       Patient ID: Vera Jensen is a 65 y.o. female.    Chief Complaint: Follow-up (CT completed recently )    History of Present Illness    CHIEF COMPLAINT:  Ms. Jensen presents today for follow up.    RESPIRATORY STATUS:  She reports improvement in breathing, describing it as "better" than previous state. A lung nodule has remained stable on x-ray for 26 months without associated concerns.    SOCIAL HISTORY:  She reports significant reduction in cigarette smoking and is actively working towards cessation. She appears motivated with potential for complete cessation within 6 months.      ROS:  General: -fever, -chills, -fatigue, -weight gain, -weight loss  Eyes: -vision changes, -redness, -discharge  ENT: -ear pain, -nasal congestion, -sore throat  Cardiovascular: -chest pain, -palpitations, -lower extremity edema  Respiratory: -cough, -shortness of breath  Gastrointestinal: -abdominal pain, -nausea, -vomiting, -diarrhea, -constipation, -blood in stool  Genitourinary: -dysuria, -hematuria, -frequency  Musculoskeletal: -joint pain, -muscle pain  Skin: -rash, -lesion  Neurological: -headache, -dizziness, -numbness, -tingling  Psychiatric: -anxiety, -depression, -sleep difficulty          Objective:      Physical Exam   Constitutional: She is oriented to person, place, and time. She appears well-developed and well-nourished.   HENT:   Head: Normocephalic.   Nose: Nose normal.   Mouth/Throat: Oropharynx is clear and moist.   Neck: No JVD present. No thyromegaly present.   Cardiovascular: Normal rate, regular rhythm, normal heart sounds and intact distal pulses.   Pulmonary/Chest: Normal expansion, hyperinflation, symmetric chest wall expansion, effort normal and breath sounds normal.   Abdominal: Soft. Bowel sounds are normal.   Musculoskeletal:         General: Normal range of motion.      Cervical back: Normal range of motion and neck supple.   Lymphadenopathy: No supraclavicular adenopathy is present.    " " She has no cervical adenopathy.   Neurological: She is alert and oriented to person, place, and time. She has normal reflexes.   Skin: Skin is warm and dry.   Psychiatric: She has a normal mood and affect. Her behavior is normal.     Personal Diagnostic Review  Ct reviewed        7/31/2025     1:52 PM 1/23/2025     1:24 PM 7/10/2024     2:30 PM 1/3/2024     3:28 PM 11/1/2023     2:10 PM 5/1/2023     4:04 PM   Pulmonary Function Tests   SpO2 93 % 93 % 97 % 94 % 88 % 95 %   Height 5' 3" (1.6 m) 5' 3" (1.6 m) 5' 3" (1.6 m) 5' 3" (1.6 m) 5' 3" (1.6 m) 5' 3" (1.6 m)   Weight 76.4 kg (168 lb 6.4 oz) 79.8 kg (175 lb 14.8 oz) 79.8 kg (176 lb) 79.2 kg (174 lb 9.6 oz) 78 kg (172 lb) 78 kg (172 lb)   BMI (Calculated) 29.8 31.2 31.2 30.9 30.5 30.5           Current Medications[1]   Assessment:       1. Chronic obstructive pulmonary disease, unspecified COPD type    2. Solitary pulmonary nodule    3. Tobacco use        Encounter Medications[2]  No orders of the defined types were placed in this encounter.      Plan:       Problem List Items Addressed This Visit       Chronic obstructive pulmonary disease, unspecified COPD type - Primary    Solitary pulmonary nodule    Tobacco use         Assessment & Plan    J44.9 Chronic obstructive pulmonary disease, unspecified COPD type  R91.1 Solitary pulmonary nodule  Z72.0 Tobacco use    IMPRESSION:  - Lung nodule on right middle lobe unchanged after 26 months of monitoring, no further workup needed.  - COPD stable.    TOBACCO USE:  - Ms. Jensen to continue efforts to quit smoking, noted efforts to reduce smoking.            This note was generated with the assistance of ambient listening technology. Verbal consent was obtained by the patient and accompanying visitor(s) for the recording of patient appointment to facilitate this note. I attest to having reviewed and edited the generated note for accuracy, though some syntax or spelling errors may persist. Please contact the author of " this note for any clarification.                  [1]   Current Outpatient Medications:     albuterol (PROVENTIL) 2.5 mg /3 mL (0.083 %) nebulizer solution, Take 3 mLs (2.5 mg total) by nebulization every 6 (six) hours as needed for Wheezing. Rescue, Disp: 90 mL, Rfl: 5    albuterol (PROVENTIL/VENTOLIN HFA) 90 mcg/actuation inhaler, Inhale 1 puff into the lungs every 6 (six) hours as needed for Wheezing. Rescue, Disp: 18 g, Rfl: 5    aspirin (ECOTRIN) 325 MG EC tablet, Take 325 mg by mouth., Disp: , Rfl:     atorvastatin (LIPITOR) 10 MG tablet, Take 10 mg by mouth., Disp: , Rfl:     calcium carbonate-vit D3-min 600 mg-10 mcg (400 unit) Tab, Take 1 tablet by mouth once daily., Disp: , Rfl:     citalopram (CELEXA) 10 MG tablet, , Disp: , Rfl:     cyanocobalamin, vitamin B-12, 2,500 mcg Tab, Take 1,000 mcg by mouth., Disp: , Rfl:     cyclobenzaprine (FLEXERIL) 5 MG tablet, 1-2 TABLET BY MOUTH THREE TIMES A DAY AS NEEDED FOR MUSCLE PAIN/MUSCLE SPASMS. MAY CAUSE DROWSINESS., Disp: , Rfl:     fluticasone propionate (FLONASE) 50 mcg/actuation nasal spray, 2 sprays by Each Nostril route., Disp: , Rfl:     lisinopriL 10 MG tablet, Take 10 mg by mouth., Disp: , Rfl:     loratadine (CLARITIN) 10 mg tablet, Take 10 mg by mouth., Disp: , Rfl:     magnesium oxide 420 mg Tab, Take by mouth., Disp: , Rfl:     meclizine (ANTIVERT) 25 mg tablet, Take 25 mg by mouth daily as needed., Disp: , Rfl:     moxifloxacin (VIGAMOX) 0.5 % ophthalmic solution, Apply 1 drop to eye., Disp: , Rfl:     mupirocin (BACTROBAN) 2 % ointment, Apply topically 2 (two) times daily., Disp: 22 g, Rfl: 3    omeprazole (PRILOSEC) 40 MG capsule, 1 CAPSULE BY MOUTH DAILY, Disp: , Rfl:     prednisoLONE acetate (PRED FORTE) 1 % DrpS, Apply 1 drop to eye., Disp: , Rfl:     traMADoL (ULTRAM) 50 mg tablet, Take 50 mg by mouth every 8 (eight) hours as needed., Disp: , Rfl:     turmeric 400 mg Cap, Take by mouth., Disp: , Rfl:     umeclidinium (INCRUSE ELLIPTA) 62.5  mcg/actuation inhalation capsule, Inhale 62.5 mcg into the lungs once daily. Controller, Disp: 60 each, Rfl: 3  [2]   Outpatient Encounter Medications as of 7/31/2025   Medication Sig Dispense Refill    albuterol (PROVENTIL) 2.5 mg /3 mL (0.083 %) nebulizer solution Take 3 mLs (2.5 mg total) by nebulization every 6 (six) hours as needed for Wheezing. Rescue 90 mL 5    albuterol (PROVENTIL/VENTOLIN HFA) 90 mcg/actuation inhaler Inhale 1 puff into the lungs every 6 (six) hours as needed for Wheezing. Rescue 18 g 5    aspirin (ECOTRIN) 325 MG EC tablet Take 325 mg by mouth.      atorvastatin (LIPITOR) 10 MG tablet Take 10 mg by mouth.      calcium carbonate-vit D3-min 600 mg-10 mcg (400 unit) Tab Take 1 tablet by mouth once daily.      citalopram (CELEXA) 10 MG tablet       cyanocobalamin, vitamin B-12, 2,500 mcg Tab Take 1,000 mcg by mouth.      cyclobenzaprine (FLEXERIL) 5 MG tablet 1-2 TABLET BY MOUTH THREE TIMES A DAY AS NEEDED FOR MUSCLE PAIN/MUSCLE SPASMS. MAY CAUSE DROWSINESS.      fluticasone propionate (FLONASE) 50 mcg/actuation nasal spray 2 sprays by Each Nostril route.      lisinopriL 10 MG tablet Take 10 mg by mouth.      loratadine (CLARITIN) 10 mg tablet Take 10 mg by mouth.      magnesium oxide 420 mg Tab Take by mouth.      meclizine (ANTIVERT) 25 mg tablet Take 25 mg by mouth daily as needed.      moxifloxacin (VIGAMOX) 0.5 % ophthalmic solution Apply 1 drop to eye.      mupirocin (BACTROBAN) 2 % ointment Apply topically 2 (two) times daily. 22 g 3    omeprazole (PRILOSEC) 40 MG capsule 1 CAPSULE BY MOUTH DAILY      prednisoLONE acetate (PRED FORTE) 1 % DrpS Apply 1 drop to eye.      traMADoL (ULTRAM) 50 mg tablet Take 50 mg by mouth every 8 (eight) hours as needed.      turmeric 400 mg Cap Take by mouth.      umeclidinium (INCRUSE ELLIPTA) 62.5 mcg/actuation inhalation capsule Inhale 62.5 mcg into the lungs once daily. Controller 60 each 3    [DISCONTINUED] L. acidophilus/Bifid. animalis 32 billion  cell Cap Take 1 capsule by mouth. (Patient not taking: Reported on 1/23/2025)      [DISCONTINUED] lactobacillus combination no.4 (PROBIOTIC) 3 billion cell Cap Take 1 capsule by mouth once daily. (Patient not taking: Reported on 7/31/2025)      [DISCONTINUED] soy-blk cohosh-green tea-yerba 56- mg Tab Take 1 tablet by mouth. (Patient not taking: Reported on 1/23/2025)       No facility-administered encounter medications on file as of 7/31/2025.